# Patient Record
Sex: MALE | URBAN - METROPOLITAN AREA
[De-identification: names, ages, dates, MRNs, and addresses within clinical notes are randomized per-mention and may not be internally consistent; named-entity substitution may affect disease eponyms.]

---

## 2021-01-11 ENCOUNTER — OFFICE VISIT (OUTPATIENT)
Dept: URBAN - METROPOLITAN AREA CLINIC 25 | Facility: CLINIC | Age: 56
End: 2021-01-11
Payer: COMMERCIAL

## 2021-01-11 DIAGNOSIS — H25.12 AGE-RELATED NUCLEAR CATARACT, LEFT EYE: Primary | ICD-10-CM

## 2021-01-11 DIAGNOSIS — H52.4 PRESBYOPIA: ICD-10-CM

## 2021-01-11 DIAGNOSIS — Q15.0 CONGENITAL GLAUCOMA: ICD-10-CM

## 2021-01-11 PROCEDURE — 99203 OFFICE O/P NEW LOW 30 MIN: CPT | Performed by: OPTOMETRIST

## 2021-01-11 ASSESSMENT — INTRAOCULAR PRESSURE
OS: 19
OD: 21

## 2021-02-05 ENCOUNTER — OFFICE VISIT (OUTPATIENT)
Dept: URBAN - METROPOLITAN AREA CLINIC 16 | Facility: CLINIC | Age: 56
End: 2021-02-05
Payer: COMMERCIAL

## 2021-02-05 DIAGNOSIS — H17.12 CENTRAL CORNEAL OPACITY, LEFT EYE: ICD-10-CM

## 2021-02-05 PROCEDURE — 99204 OFFICE O/P NEW MOD 45 MIN: CPT | Performed by: OPHTHALMOLOGY

## 2021-02-05 ASSESSMENT — INTRAOCULAR PRESSURE
OS: 15
OD: 15

## 2021-02-05 ASSESSMENT — KERATOMETRY: OD: 44.75

## 2021-02-05 ASSESSMENT — VISUAL ACUITY
OS: 20/200
OD: 20/25

## 2021-02-05 NOTE — IMPRESSION/PLAN
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

## 2021-02-05 NOTE — IMPRESSION/PLAN
Impression: Combined forms of age-related cataract, left eye: H25.812. .  Visually significant, quality of life issue, could improve with surgery. Plan: Cataracts account for the patient's complaints. Discussed all risks, benefits, alternatives, procedures and recovery. Patient understands changing glasses will not improve vision. Patient desires to have surgery, recommend phacoemulsification with intraocular lens implant OS. lvl 2 - pt not for premium IOL -  AIM plano. OK for Dexcyu.

## 2021-06-11 ENCOUNTER — OFFICE VISIT (OUTPATIENT)
Dept: URBAN - METROPOLITAN AREA CLINIC 16 | Facility: CLINIC | Age: 56
End: 2021-06-11
Payer: COMMERCIAL

## 2021-06-11 DIAGNOSIS — H25.812 COMBINED FORMS OF AGE-RELATED CATARACT, LEFT EYE: Primary | ICD-10-CM

## 2021-06-11 PROCEDURE — 99214 OFFICE O/P EST MOD 30 MIN: CPT | Performed by: OPHTHALMOLOGY

## 2021-06-11 ASSESSMENT — INTRAOCULAR PRESSURE
OD: 18
OS: 18

## 2021-06-11 ASSESSMENT — VISUAL ACUITY: OS: 20/60

## 2021-06-11 NOTE — IMPRESSION/PLAN
Impression: Combined forms of age-related cataract, left eye: H25.812. .  Visually significant, quality of life issue, could improve with surgery. Plan: Cataracts account for the patient's complaints. Discussed all risks, benefits, alternatives, procedures and recovery. Patient understands changing glasses will not improve vision. Patient desires to have surgery, recommend phacoemulsification with intraocular lens implant OS. lvl 2 - pt declines premium IOL -  AIM plano. OK for Dexcyu. amblyopia will affect vision, pt has Haabs striae OS.

## 2022-06-29 ENCOUNTER — OFFICE VISIT (OUTPATIENT)
Dept: URBAN - METROPOLITAN AREA CLINIC 23 | Facility: CLINIC | Age: 57
End: 2022-06-29
Payer: COMMERCIAL

## 2022-06-29 DIAGNOSIS — H25.812 COMBINED FORMS OF AGE-RELATED CATARACT, LEFT EYE: Primary | ICD-10-CM

## 2022-06-29 PROCEDURE — 99214 OFFICE O/P EST MOD 30 MIN: CPT | Performed by: OPTOMETRIST

## 2022-06-29 ASSESSMENT — KERATOMETRY: OD: 44.50

## 2022-06-29 ASSESSMENT — INTRAOCULAR PRESSURE
OD: 16
OS: 16

## 2022-06-29 NOTE — IMPRESSION/PLAN
Impression: Combined forms of age-related cataract, left eye: H25.812. Plan: Cataracts account for the patient's complaints. Discussed diagnosis in detail with patient. Discussed all R/B's and procedures. Patient elects to have surgery, phacoemulsification with intraocular lens recommended. Discussed lens options of Toric/Multifocal, or Standard lens, RL2. Will refer to cataract surgeon for pre-operative evaluation.

## 2022-07-06 ENCOUNTER — OFFICE VISIT (OUTPATIENT)
Dept: URBAN - METROPOLITAN AREA CLINIC 23 | Facility: CLINIC | Age: 57
End: 2022-07-06
Payer: COMMERCIAL

## 2022-07-06 DIAGNOSIS — H25.11 AGE-RELATED NUCLEAR CATARACT, RIGHT EYE: ICD-10-CM

## 2022-07-06 DIAGNOSIS — H25.812 COMBINED FORMS OF AGE-RELATED CATARACT, LEFT EYE: Primary | ICD-10-CM

## 2022-07-06 DIAGNOSIS — H18.512 ENDOTHELIAL CORNEAL DYSTROPHY, LEFT EYE: ICD-10-CM

## 2022-07-06 PROCEDURE — 99214 OFFICE O/P EST MOD 30 MIN: CPT | Performed by: OPHTHALMOLOGY

## 2022-07-06 ASSESSMENT — INTRAOCULAR PRESSURE
OS: 19
OD: 22

## 2022-07-06 ASSESSMENT — KERATOMETRY
OD: 44.88
OS: 42.13

## 2022-07-06 NOTE — IMPRESSION/PLAN
Impression: Combined forms of age-related cataract, left eye: H25.812. Condition: established, worsening. Symptoms: could improve with surgery. Plan: Cataract accounts for patient's complaints. Reviewed risks, benefits, and procedure. Patient desires surgery, schedule ce/iol OS, RL2, discussed lens options, distance refractive target, patient is clear for surgery in [facility]. Recommend Dexycu to avoid noncompliance with drops and to help maintain infection/inflammation.

## 2022-07-06 NOTE — IMPRESSION/PLAN
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 Haab's striae Plan: Discussed diagnosis in detail with patient. Advise pt of possible progression of condition after cataract surgery. cornea Risk and benefits associated with cataract surgery discussed in great detail.

## 2022-10-04 ENCOUNTER — TESTING ONLY (OUTPATIENT)
Dept: URBAN - METROPOLITAN AREA CLINIC 23 | Facility: CLINIC | Age: 57
End: 2022-10-04
Payer: COMMERCIAL

## 2022-10-04 DIAGNOSIS — H25.11 AGE-RELATED NUCLEAR CATARACT, RIGHT EYE: Primary | ICD-10-CM

## 2022-10-04 ASSESSMENT — PACHYMETRY
OD: 3.06
OD: 23.83
OS: 3.89
OS: 23.80

## 2022-10-18 ENCOUNTER — SURGERY (OUTPATIENT)
Dept: URBAN - METROPOLITAN AREA SURGERY 11 | Facility: SURGERY | Age: 57
End: 2022-10-18
Payer: COMMERCIAL

## 2022-10-18 PROCEDURE — 66984 XCAPSL CTRC RMVL W/O ECP: CPT | Performed by: OPHTHALMOLOGY

## 2022-10-19 ENCOUNTER — POST-OPERATIVE VISIT (OUTPATIENT)
Dept: URBAN - METROPOLITAN AREA CLINIC 23 | Facility: CLINIC | Age: 57
End: 2022-10-19
Payer: COMMERCIAL

## 2022-10-19 DIAGNOSIS — Z48.810 ENCOUNTER FOR SURGICAL AFTERCARE FOLLOWING SURGERY ON A SENSE ORGAN: Primary | ICD-10-CM

## 2022-10-19 PROCEDURE — 99024 POSTOP FOLLOW-UP VISIT: CPT | Performed by: OPTOMETRIST

## 2022-10-19 ASSESSMENT — INTRAOCULAR PRESSURE
OS: 19
OD: 19

## 2022-10-19 NOTE — IMPRESSION/PLAN
Impression: S/P Cataract Extraction by phacoemulsification with IOL placement; DEXYCU OS - 1 Day. Encounter for surgical aftercare following surgery on a sense organ  Z48.810. Excellent post op course   Post operative instructions reviewed - Plan: Pt edu. Appropriate appearance and IOP. Use AT's PRN OU for comfort. Advised patient to call with any issues. RTC as scheduled. --Advised patient to use artificial tears for comfort.

## 2022-10-25 ENCOUNTER — POST-OPERATIVE VISIT (OUTPATIENT)
Dept: URBAN - METROPOLITAN AREA CLINIC 23 | Facility: CLINIC | Age: 57
End: 2022-10-25
Payer: COMMERCIAL

## 2022-10-25 DIAGNOSIS — Z48.810 ENCOUNTER FOR SURGICAL AFTERCARE FOLLOWING SURGERY ON A SENSE ORGAN: Primary | ICD-10-CM

## 2022-10-25 PROCEDURE — 99024 POSTOP FOLLOW-UP VISIT: CPT | Performed by: OPTOMETRIST

## 2022-10-25 RX ORDER — PREDNISOLONE ACETATE 10 MG/ML
1 % SUSPENSION/ DROPS OPHTHALMIC
Qty: 5 | Refills: 0 | Status: ACTIVE
Start: 2022-10-25

## 2022-10-25 ASSESSMENT — INTRAOCULAR PRESSURE
OD: 19
OS: 20

## 2022-10-25 NOTE — IMPRESSION/PLAN
Impression: S/P Cataract Extraction by phacoemulsification with IOL placement; DEXYCU OS - 7 Days. Encounter for surgical aftercare following surgery on a sense organ  Z48.810. Plan: Pt edu. Appropriate appearance and IOP. Some swelling remains OS. Start Pred Acetate TID OS for 2 weeks. Use AT's PRN OU for comfort. Advised patient to call with any issues. RTC 3-4 weeks PO3.

## 2022-11-08 ENCOUNTER — POST-OPERATIVE VISIT (OUTPATIENT)
Dept: URBAN - METROPOLITAN AREA CLINIC 23 | Facility: CLINIC | Age: 57
End: 2022-11-08
Payer: COMMERCIAL

## 2022-11-08 DIAGNOSIS — Z48.810 ENCOUNTER FOR SURGICAL AFTERCARE FOLLOWING SURGERY ON A SENSE ORGAN: Primary | ICD-10-CM

## 2022-11-08 PROCEDURE — 99024 POSTOP FOLLOW-UP VISIT: CPT | Performed by: OPTOMETRIST

## 2022-11-08 RX ORDER — PREDNISOLONE ACETATE 10 MG/ML
1 % SUSPENSION/ DROPS OPHTHALMIC
Qty: 5 | Refills: 1 | Status: ACTIVE
Start: 2022-11-08

## 2022-11-08 ASSESSMENT — INTRAOCULAR PRESSURE
OD: 19
OS: 20

## 2022-11-08 NOTE — IMPRESSION/PLAN
Impression:  Encounter for surgical aftercare following surgery on a sense organ  Z48.810. Plan: Pt edu. Appropriate IOP OU. Corneal haze remains OS. Refer to Dr. Carlton Casillas for further evaluation. Continue Pred Acetate QID OS. Use AT's PRN OU for comfort. Advised patient to call with any issues. RTC 1-2 weeks Dr. Carlton Casillas.

## 2022-11-16 ENCOUNTER — POST-OPERATIVE VISIT (OUTPATIENT)
Dept: URBAN - METROPOLITAN AREA CLINIC 23 | Facility: CLINIC | Age: 57
End: 2022-11-16
Payer: COMMERCIAL

## 2022-11-16 DIAGNOSIS — Z48.810 ENCOUNTER FOR SURGICAL AFTERCARE FOLLOWING SURGERY ON A SENSE ORGAN: Primary | ICD-10-CM

## 2022-11-16 PROCEDURE — 99024 POSTOP FOLLOW-UP VISIT: CPT | Performed by: OPHTHALMOLOGY

## 2022-11-16 RX ORDER — SODIUM CHLORIDE 50 MG/G
5 % OINTMENT OPHTHALMIC
Qty: 3 | Refills: 1 | Status: ACTIVE
Start: 2022-11-16

## 2022-11-16 ASSESSMENT — INTRAOCULAR PRESSURE
OS: 20
OD: 20

## 2022-11-16 NOTE — IMPRESSION/PLAN
Impression: S/P Cataract Extraction by phacoemulsification with IOL placement; DEXYCU OS - 29 Days. Encounter for surgical aftercare following surgery on a sense organ  Z48.810. Post operative instructions reviewed - Plan: Persistent corneal edema OS. Explained to pt option for cornea transplant may be considered in future. Recommend monitoring for now for any possible improvement. Continue using Pred Forte BID OS. Start Marilin 128 leana QID OS (ERX'd). Will reassess in 4wks.

## 2022-12-14 ENCOUNTER — POST-OPERATIVE VISIT (OUTPATIENT)
Dept: URBAN - METROPOLITAN AREA CLINIC 23 | Facility: CLINIC | Age: 57
End: 2022-12-14
Payer: COMMERCIAL

## 2022-12-14 DIAGNOSIS — Z48.810 ENCOUNTER FOR SURGICAL AFTERCARE FOLLOWING SURGERY ON A SENSE ORGAN: Primary | ICD-10-CM

## 2022-12-14 PROCEDURE — 99024 POSTOP FOLLOW-UP VISIT: CPT | Performed by: OPHTHALMOLOGY

## 2022-12-14 ASSESSMENT — INTRAOCULAR PRESSURE
OD: 20
OS: 20

## 2022-12-14 NOTE — IMPRESSION/PLAN
Impression: S/P Cataract Extraction by phacoemulsification with IOL placement; DEXYCU OS - 57 Days. Encounter for surgical aftercare following surgery on a sense organ  Z48.810. Plan: Discussed microcystic edema in great detail and would recommend seeing corneal specialist for possible DSEK OS. Can discontinue pred ace today. Pt never used [employer]. --Advised patient to use artificial tears for comfort. --Discontinue Prednisolone acetate OS.

## 2022-12-16 ENCOUNTER — OFFICE VISIT (OUTPATIENT)
Dept: URBAN - METROPOLITAN AREA CLINIC 33 | Facility: CLINIC | Age: 57
End: 2022-12-16
Payer: COMMERCIAL

## 2022-12-16 DIAGNOSIS — H18.232 SECONDARY CORNEAL EDEMA, LEFT EYE: Primary | ICD-10-CM

## 2022-12-16 PROCEDURE — 99213 OFFICE O/P EST LOW 20 MIN: CPT | Performed by: OPHTHALMOLOGY

## 2022-12-16 ASSESSMENT — INTRAOCULAR PRESSURE
OD: 18
OS: 18

## 2022-12-16 NOTE — IMPRESSION/PLAN
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 Condition: quality of life issue. Symptoms: may improve with surgery. 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 Discussed treatment options with patient. Discussed observation vs surgery. Recommend observation at this time and reassess for possible DSEK OS in 1 month. Continue gtts as prescribed. Patient understands that symptoms may or may not improve.

## 2023-01-17 ENCOUNTER — POST-OPERATIVE VISIT (OUTPATIENT)
Dept: URBAN - METROPOLITAN AREA CLINIC 23 | Facility: CLINIC | Age: 58
End: 2023-01-17
Payer: COMMERCIAL

## 2023-01-17 DIAGNOSIS — Z48.810 ENCOUNTER FOR SURGICAL AFTERCARE FOLLOWING SURGERY ON A SENSE ORGAN: Primary | ICD-10-CM

## 2023-01-17 PROCEDURE — 99024 POSTOP FOLLOW-UP VISIT: CPT | Performed by: OPHTHALMOLOGY

## 2023-01-17 ASSESSMENT — KERATOMETRY
OD: 44.75
OS: 43.25

## 2023-01-17 ASSESSMENT — INTRAOCULAR PRESSURE
OS: 18
OD: 20

## 2023-01-17 NOTE — IMPRESSION/PLAN
Impression: S/P Cataract Extraction by phacoemulsification with IOL placement; DEXYCU OS - 91 Days. Encounter for surgical aftercare following surgery on a sense organ  Z48.810. Post operative instructions reviewed - Plan: Discussed epithelial edema, Discussed treatment options. Discussed ongoing topical therapy vs surgery. Pt defers surgery at this time and elects observation. Will reassess in 1 month.  OK to D/C PF if patient desires

## 2023-06-28 ENCOUNTER — OFFICE VISIT (OUTPATIENT)
Dept: URBAN - METROPOLITAN AREA CLINIC 33 | Facility: CLINIC | Age: 58
End: 2023-06-28
Payer: COMMERCIAL

## 2023-06-28 DIAGNOSIS — H18.232 SECONDARY CORNEAL EDEMA, LEFT EYE: Primary | ICD-10-CM

## 2023-06-28 PROCEDURE — 99214 OFFICE O/P EST MOD 30 MIN: CPT | Performed by: OPHTHALMOLOGY

## 2023-06-28 RX ORDER — PREDNISOLONE ACETATE 10 MG/ML
1 % SUSPENSION/ DROPS OPHTHALMIC
Qty: 10 | Refills: 1 | Status: ACTIVE
Start: 2023-06-28

## 2023-06-28 RX ORDER — OFLOXACIN 3 MG/ML
0.3 % SOLUTION/ DROPS OPHTHALMIC
Qty: 5 | Refills: 1 | Status: ACTIVE
Start: 2023-06-28

## 2023-06-28 ASSESSMENT — INTRAOCULAR PRESSURE
OD: 19
OS: 18

## 2023-06-28 NOTE — IMPRESSION/PLAN
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 Condition: quality of life issue. Symptoms: could improve with surgery. Vision: vision affected. Plan: Discussed diagnosis in detail with patient. Discussed risks of progression. Discussed treatment options with patient. Surgical treatment is required. Surgical risks and benefits were discussed, explained and understood by patient. Patient elects to have surgery. RL-2 Discussed post operative elevation restrictions, and positioning requirements.

## 2023-12-14 ENCOUNTER — OFFICE VISIT (OUTPATIENT)
Dept: URBAN - METROPOLITAN AREA CLINIC 23 | Facility: CLINIC | Age: 58
End: 2023-12-14
Payer: COMMERCIAL

## 2023-12-14 DIAGNOSIS — H18.232 SECONDARY CORNEAL EDEMA, LEFT EYE: Primary | ICD-10-CM

## 2023-12-14 PROCEDURE — 76514 ECHO EXAM OF EYE THICKNESS: CPT | Performed by: OPHTHALMOLOGY

## 2023-12-14 PROCEDURE — 99213 OFFICE O/P EST LOW 20 MIN: CPT | Performed by: OPHTHALMOLOGY

## 2023-12-14 RX ORDER — PREDNISOLONE ACETATE 10 MG/ML
1 % SUSPENSION/ DROPS OPHTHALMIC
Qty: 10 | Refills: 1 | Status: ACTIVE
Start: 2023-12-14

## 2023-12-14 RX ORDER — OFLOXACIN 3 MG/ML
0.3 % SOLUTION/ DROPS OPHTHALMIC
Qty: 5 | Refills: 1 | Status: INACTIVE
Start: 2023-12-14 | End: 2023-12-27

## 2023-12-14 ASSESSMENT — INTRAOCULAR PRESSURE
OD: 18
OS: 18

## 2023-12-14 ASSESSMENT — KERATOMETRY: OD: 44.75

## 2023-12-18 ENCOUNTER — SURGERY (OUTPATIENT)
Dept: URBAN - METROPOLITAN AREA SURGERY 15 | Facility: SURGERY | Age: 58
End: 2023-12-18
Payer: COMMERCIAL

## 2023-12-18 PROCEDURE — 65756 CORNEAL TRNSPL ENDOTHELIAL: CPT | Performed by: OPHTHALMOLOGY

## 2023-12-18 RX ORDER — HYDROCODONE BITARTRATE AND ACETAMINOPHEN 10; 325 MG/1; MG/1
TABLET ORAL
Qty: 10 | Refills: 0 | Status: ACTIVE
Start: 2023-12-18

## 2023-12-19 ENCOUNTER — POST-OPERATIVE VISIT (OUTPATIENT)
Dept: URBAN - METROPOLITAN AREA CLINIC 23 | Facility: CLINIC | Age: 58
End: 2023-12-19
Payer: COMMERCIAL

## 2023-12-19 PROCEDURE — 99024 POSTOP FOLLOW-UP VISIT: CPT | Performed by: OPHTHALMOLOGY

## 2023-12-19 ASSESSMENT — INTRAOCULAR PRESSURE
OD: 20
OS: 18

## 2023-12-27 ENCOUNTER — POST-OPERATIVE VISIT (OUTPATIENT)
Dept: URBAN - METROPOLITAN AREA CLINIC 23 | Facility: CLINIC | Age: 58
End: 2023-12-27
Payer: COMMERCIAL

## 2023-12-27 PROCEDURE — 99024 POSTOP FOLLOW-UP VISIT: CPT | Performed by: OPHTHALMOLOGY

## 2023-12-27 RX ORDER — LOTEPREDNOL ETABONATE 5 MG/ML
0.5 % SUSPENSION/ DROPS OPHTHALMIC
Qty: 5 | Refills: 6 | Status: ACTIVE
Start: 2023-12-27

## 2023-12-27 RX ORDER — LATANOPROST 50 UG/ML
0.005 % SOLUTION OPHTHALMIC
Qty: 2.5 | Refills: 6 | Status: ACTIVE
Start: 2023-12-27

## 2023-12-27 ASSESSMENT — INTRAOCULAR PRESSURE
OD: 21
OS: 31

## 2024-01-11 ENCOUNTER — POST-OPERATIVE VISIT (OUTPATIENT)
Dept: URBAN - METROPOLITAN AREA CLINIC 23 | Facility: CLINIC | Age: 59
End: 2024-01-11
Payer: COMMERCIAL

## 2024-01-11 PROCEDURE — 99024 POSTOP FOLLOW-UP VISIT: CPT | Performed by: OPHTHALMOLOGY

## 2024-01-11 RX ORDER — BRIMONIDINE TARTRATE 1.5 MG/ML
0.15 % SOLUTION/ DROPS OPHTHALMIC
Qty: 15 | Refills: 2 | Status: ACTIVE
Start: 2024-01-11

## 2024-01-11 ASSESSMENT — INTRAOCULAR PRESSURE
OD: 23
OS: 33

## 2024-02-08 ENCOUNTER — POST-OPERATIVE VISIT (OUTPATIENT)
Dept: URBAN - METROPOLITAN AREA CLINIC 23 | Facility: CLINIC | Age: 59
End: 2024-02-08
Payer: COMMERCIAL

## 2024-02-08 DIAGNOSIS — Z48.810 ENCOUNTER FOR SURGICAL AFTERCARE FOLLOWING SURGERY ON A SENSE ORGAN: Primary | ICD-10-CM

## 2024-02-08 PROCEDURE — 99024 POSTOP FOLLOW-UP VISIT: CPT | Performed by: OPHTHALMOLOGY

## 2024-02-08 ASSESSMENT — KERATOMETRY
OD: 44.75
OS: 40.88

## 2024-02-08 ASSESSMENT — INTRAOCULAR PRESSURE
OD: 21
OS: 20

## 2024-03-07 ENCOUNTER — POST-OPERATIVE VISIT (OUTPATIENT)
Dept: URBAN - METROPOLITAN AREA CLINIC 23 | Facility: CLINIC | Age: 59
End: 2024-03-07
Payer: COMMERCIAL

## 2024-03-07 DIAGNOSIS — Z48.810 ENCOUNTER FOR SURGICAL AFTERCARE FOLLOWING SURGERY ON A SENSE ORGAN: Primary | ICD-10-CM

## 2024-03-07 PROCEDURE — 99024 POSTOP FOLLOW-UP VISIT: CPT | Performed by: OPHTHALMOLOGY

## 2024-03-07 ASSESSMENT — INTRAOCULAR PRESSURE
OS: 18
OD: 18

## 2024-06-27 ENCOUNTER — OFFICE VISIT (OUTPATIENT)
Dept: URBAN - METROPOLITAN AREA CLINIC 23 | Facility: CLINIC | Age: 59
End: 2024-06-27
Payer: COMMERCIAL

## 2024-06-27 DIAGNOSIS — Z94.7 CORNEAL TRANSPLANT STATUS: Primary | ICD-10-CM

## 2024-06-27 PROCEDURE — 99024 POSTOP FOLLOW-UP VISIT: CPT | Performed by: OPHTHALMOLOGY

## 2024-06-27 ASSESSMENT — INTRAOCULAR PRESSURE
OS: 20
OD: 20